# Patient Record
Sex: FEMALE | Race: WHITE | NOT HISPANIC OR LATINO | Employment: UNEMPLOYED | URBAN - NONMETROPOLITAN AREA
[De-identification: names, ages, dates, MRNs, and addresses within clinical notes are randomized per-mention and may not be internally consistent; named-entity substitution may affect disease eponyms.]

---

## 2017-01-18 ENCOUNTER — OFFICE VISIT - GICH (OUTPATIENT)
Dept: FAMILY MEDICINE | Facility: OTHER | Age: 37
End: 2017-01-18

## 2017-01-18 DIAGNOSIS — J18.9 PNEUMONIA: ICD-10-CM

## 2017-01-18 LAB — INFLUENZA ANTIGEN - HISTORICAL: NORMAL

## 2017-07-03 ENCOUNTER — OFFICE VISIT - GICH (OUTPATIENT)
Dept: FAMILY MEDICINE | Facility: OTHER | Age: 37
End: 2017-07-03

## 2017-07-03 ENCOUNTER — HISTORY (OUTPATIENT)
Dept: FAMILY MEDICINE | Facility: OTHER | Age: 37
End: 2017-07-03

## 2017-07-03 DIAGNOSIS — J00 ACUTE NASOPHARYNGITIS (COMMON COLD): ICD-10-CM

## 2017-08-07 ENCOUNTER — OFFICE VISIT - GICH (OUTPATIENT)
Dept: FAMILY MEDICINE | Facility: OTHER | Age: 37
End: 2017-08-07

## 2017-08-07 ENCOUNTER — HISTORY (OUTPATIENT)
Dept: FAMILY MEDICINE | Facility: OTHER | Age: 37
End: 2017-08-07

## 2017-08-07 DIAGNOSIS — R10.84 GENERALIZED ABDOMINAL PAIN: ICD-10-CM
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
## 2017-12-28 NOTE — PROGRESS NOTES
"Patient Information     Patient Name MRN Sex Gretchen Hackett [id number] Female 1980      Progress Notes by Jayda Vinson NP at 7/3/2017 12:00 PM     Author:  Jayda Vinson NP Service:  (none) Author Type:  PHYS- Nurse Practitioner     Filed:  7/3/2017 12:21 PM Encounter Date:  7/3/2017 Status:  Signed     :  Jayda Vinson NP (PHYS- Nurse Practitioner)            Nursing Notes:   Mirela Jackson  7/3/2017 12:15 PM  Signed  Patient presents to clinic with chest congestion x2days  Mirela JacksonLPN ....................  7/3/2017   12:05 PM      SUBJECTIVE:    Gretchen Vogel is a 37 y.o. female who presents for Chest Congestion and cough for 2 days    URI    This is a new problem. The current episode started yesterday. The problem has been gradually worsening. Maximum temperature: 100.2. The fever has been present for less than 1 day. Associated symptoms include congestion, coughing, rhinorrhea and a sore throat. Pertinent negatives include no chest pain, ear pain, headaches, plugged ear sensation, rash, sinus pain, sneezing, swollen glands, vomiting or wheezing. She has tried decongestant, NSAIDs, sleep and increased fluids for the symptoms. The treatment provided mild relief.       No current outpatient prescriptions on file prior to visit.     No current facility-administered medications on file prior to visit.        REVIEW OF SYSTEMS:  Review of Systems   HENT: Positive for congestion, rhinorrhea and sore throat. Negative for ear pain and sneezing.    Respiratory: Positive for cough. Negative for wheezing.    Cardiovascular: Negative for chest pain.   Gastrointestinal: Negative for vomiting.   Skin: Negative for rash.   Neurological: Negative for headaches.       OBJECTIVE:  /72  Pulse 68  Temp 98.5  F (36.9  C) (Tympanic)  Resp 18  Ht 1.664 m (5' 5.5\")  Wt 79.5 kg (175 lb 3.2 oz)  LMP 2017  BMI 28.71 kg/m2    EXAM:   Physical Exam   Constitutional: She is " well-developed, well-nourished, and in no distress.   HENT:   Head: Normocephalic and atraumatic.   Right Ear: Tympanic membrane and ear canal normal.   Left Ear: Tympanic membrane and ear canal normal.   Nose: Rhinorrhea present.   Mouth/Throat: Uvula is midline, oropharynx is clear and moist and mucous membranes are normal.   Eyes: Conjunctivae are normal.   Neck: Neck supple.   Cardiovascular: Normal rate, regular rhythm and normal heart sounds.    Pulmonary/Chest: Effort normal. No respiratory distress. She has no decreased breath sounds. She has no wheezes. She has no rhonchi. She has no rales.   Dry cough is heard   Lymphadenopathy:     She has no cervical adenopathy.   Nursing note and vitals reviewed.      ASSESSMENT/PLAN:    ICD-10-CM    1. Acute nasopharyngitis J00         Plan:  Home cares and OTC gone over. Symptoms likely due to virus. No antibiotic is needed at this time. Symptoms typically worse on days 2-5 and then stabilize and you are sick for days 5-12. Days 12-14 there is slow resolution and if there is a cough, studies show it can linger longer, however one is not as ill as in the beginning. If symptoms begin worsening or fail to improve after 14 days, return to clinic for reevaluation. All questions were answered and she is in agreement with plan.       AMELIA STERN NP ....................  7/3/2017   12:21 PM

## 2017-12-28 NOTE — PATIENT INSTRUCTIONS
Patient Information     Patient Name MRN Sex Gretchen Hackett [id number] Female 1980      Patient Instructions by Geoffrey Schafer MD at 2017  2:00 PM     Author:  Geoffrey Schafer MD Service:  (none) Author Type:  Physician     Filed:  2017  3:25 PM Encounter Date:  2017 Status:  Signed     :  Geoffrey Schafer MD (Physician)            Take omeprazole 20 mg daily for 2 months  Take sucralfate four times daily for 10 days, then stop  If not better in 2 weeks, then return to consider other testing

## 2017-12-29 NOTE — PATIENT INSTRUCTIONS
Patient Information     Patient Name MRN Gretchen Doyle [id number] Female 1980      Patient Instructions by Jayda Vinson NP at 7/3/2017 12:00 PM     Author:  Jayda Vinson NP Service:  (none) Author Type:  PHYS- Nurse Practitioner     Filed:  7/3/2017 12:16 PM Encounter Date:  7/3/2017 Status:  Signed     :  Jayda Vinson NP (PHYS- Nurse Practitioner)            Cold Medicines   What are cold medicines?  Symptoms of the common cold start gradually over several days and usually last about two weeks. Symptoms may include sneezing, a stuffy or runny nose, sore throat, cough, watery eyes, mild headache, or body aches. A cold will go away on its own without treatment. However, there are many nonprescription products that may help relieve some of the symptoms of a cold. Cold medicines often contain more than one ingredient and are used to treat more than one symptom. Read the labels and buy products that have only the ingredients that you need. If you are not sure which medicine is best, ask your pharmacist.  How do they work?  Decongestants reduce swelling in your nose and sinuses. They may also lessen the amount of mucus made by your nose. If you use decongestants more often than directed, your stuffy nose may get worse.   Antihistamines block the effect of histamine. Histamine is a chemical your body makes when you have an allergic reaction. Antihistamines are most often used to treat itchy or watery eyes or a stuffy or runny nose caused by an allergy. Antihistamines may not help a stuffy or runny nose caused by a cold because they can make mucus thick and dry.  Mucolytics are medicines that make mucus thinner so that it is easier to cough up out of your throat and lungs.  Expectorants are cough medicines that may help to keep the mucus thin and bring up mucus from the lungs when you cough. This may relieve chest congestion and make it easier to breathe.   Cough suppressants  (antitussives) are medicines that lessen the urge to cough. They may give relief from a dry, hacking cough. If you have a cough that is wet sounding and produces mucus, it is important for you to cough the mucus up out of your lungs. For this reason, cough suppressants are not recommended for a wet sounding cough.  Fever and pain relievers, such as acetaminophen, aspirin, or other nonsteroidal anti-inflammatory drugs (NSAIDs), are often included in cold medicine. Read labels carefully to avoid taking more medicine than you need.  What else do I need to know about this medicine?    Talk to your healthcare provider if your symptoms start suddenly or you have severe symptoms. This may mean you have something more serious than a cold.    Follow the directions that come with your medicine, including information about food or alcohol. Make sure you know how and when to take your medicine. Do not take more or less than you are supposed to take.    Try to get all of your medicine at the same place. Your pharmacist can help make sure that all of your medicines are safe to take together.    Keep a list of your medicines with you. List all of the prescription medicines, nonprescription medicines, supplements, natural remedies, and vitamins that you take. Tell all healthcare providers who treat you about all of the products you are taking.    Many medicines have side effects. A side effect is a symptom or problem that is caused by the medicine. Ask your healthcare provider or pharmacist what side effects the medicine may cause and what you should do if you have side effects.    Nonsteroidal anti-inflammatory medicines (NSAIDs), such as ibuprofen, naproxen, and aspirin, may cause stomach bleeding and other problems. These risks increase with age. Read the label and take as directed. Unless recommended by your healthcare provider, do not take for more than 10 days for any reason.    Acetaminophen may cause liver damage or other  problems. Unless recommended by your provider, don't take more than 3000 milligrams (mg) in 24 hours. To make sure you don t take too much, check other medicines you take to see if they also contain acetaminophen. Ask your provider if you need to avoid drinking alcohol while taking this medicine.  If you have any questions, ask your healthcare provider or pharmacist for more information. Be sure to keep all appointments for provider visits or tests.      Symptoms likely due to virus. No antibiotic is needed at this time. Symptoms typically worse on days 2-5 and then stabilize and you are sick for days 5-12. Days 12-14 there is slow resolution and if there is a cough, studies show it can linger longer, however one is not as ill as in the beginning. If symptoms begin worsening or fail to improve after 14 days, return to clinic for reevaluation.

## 2017-12-30 NOTE — NURSING NOTE
Patient Information     Patient Name MRN Gretchen Doyle [id number] Female 1980      Nursing Note by Mabel Devine at 2017  2:00 PM     Author:  Mabel Devine Service:  (none) Author Type:  (none)     Filed:  2017  3:05 PM Encounter Date:  2017 Status:  Signed     :  Mabel Devine            Maalox Plus ordered by Geoffrey Schafer MD.  Medication administered per written order   Lot # [id number]  Exp. 10/31/18  NDC 9620-4969-30  Patient tolerated well.  Mabel Devine LPN..................2017  3:02 PMM

## 2017-12-30 NOTE — NURSING NOTE
Patient Information     Patient Name MRN Gretchen Doyle [id number] Female 1980      Nursing Note by Mabel Devine at 2017  2:00 PM     Author:  Mabel Devine Service:  (none) Author Type:  (none)     Filed:  2017  2:31 PM Encounter Date:  2017 Status:  Signed     :  Mabel Devinesandra Vogel is a 37 y.o. female presenting with abdominal pain along with left sided facial/ear pain.  Mabel Devine LPN 2017 2:22 PM

## 2017-12-30 NOTE — NURSING NOTE
Patient Information     Patient Name MRN Sex Gretchen Hackett [id number] Female 1980      Nursing Note by Mirela Jackson at 7/3/2017 12:00 PM     Author:  Mirela Jackson Service:  (none) Author Type:  (none)     Filed:  7/3/2017 12:15 PM Encounter Date:  7/3/2017 Status:  Signed     :  Mirela Jackson            Patient presents to clinic with chest congestion x2days  Mirela Barclay ....................  7/3/2017   12:05 PM

## 2018-01-03 NOTE — PROGRESS NOTES
"Patient Information     Patient Name MRN Sex Gretchen Hackett [id number] Female 1980      Progress Notes by Zach Trevino MD at 2017  1:30 PM     Author:  Zach Trevino MD Service:  (none) Author Type:  Physician     Filed:  2017  9:53 PM Encounter Date:  2017 Status:  Signed     :  Zach Trevino MD (Physician)            SUBJECTIVE:  Gretchen Vogel is a 36 y.o. Female.  She comes in due to a 1wk history of cough.  She reports intermittent fevers and chills but has not recorded her temperature.  She reports increased shortness of breath and sputum production over the past two days.  She thought she had a cigarette \"allergy\" so she started vaping instead which did not help.  She is concerned about a yellow film she developed today on her tongue.      OBJECTIVE:  /78  Temp 100.9  F (38.3  C)  Wt 86.6 kg (191 lb)  BMI 31.3 kg/m2  EXAM:  General Appearance: Pleasant, alert, appropriate appearance for age. No acute distress  OroPharynx Exam: I do not appreciate any abnormality today  Chest/Respiratory Exam: Bilateral rhonchi.  Good air entry.  Cardiovascular Exam: Regular rate and rhythm. S1, S2, no murmur, click, gallop, or rubs.    Results for orders placed or performed in visit on 17       INFLUENZA ANTIGEN A & B  CLINIC IN HOUSE       Result   Value Ref Range    INFLUENZA ANTIGEN                                 Negative for Influenza A antigen; Negative for Influenza B antigen         ASSESSMENT/Plan :      Gretchen was seen today for cough.    Diagnoses and all orders for this visit:    CAP (community acquired pneumonia)  Patient was quite resistant to any lab workup and declined imaging.  She did finally agree to influenza testing after significant discussion.  Given her fever and worsening symptoms will treat with azithromycin but suggest close follow up if symptoms do not resolve promptly.    Strongly suggest tobacco cessation and cessation of " vaping.  Unclear if this could have contributed to her perceived yellowing of the tongue.    -     INFLUENZA ANTIGEN A & B; Future  -     INFLUENZA ANTIGEN A & B  -     azithromycin (ZITHROMAX) 250 mg tablet; Take 500 mg (2 tabs) by mouth on day 1, then 250 mg (1 tab) daily for days 2-5.        There are no Patient Instructions on file for this visit.    Zach Trevino MD    This document was created using computer generated templates and voice activated software.

## 2018-01-25 VITALS
HEART RATE: 68 BPM | TEMPERATURE: 98.5 F | HEIGHT: 66 IN | BODY MASS INDEX: 28.16 KG/M2 | SYSTOLIC BLOOD PRESSURE: 124 MMHG | WEIGHT: 175.2 LBS | RESPIRATION RATE: 18 BRPM | DIASTOLIC BLOOD PRESSURE: 72 MMHG

## 2018-01-26 VITALS
HEART RATE: 121 BPM | WEIGHT: 170 LBS | DIASTOLIC BLOOD PRESSURE: 81 MMHG | SYSTOLIC BLOOD PRESSURE: 137 MMHG | BODY MASS INDEX: 27.86 KG/M2

## 2018-01-26 VITALS
DIASTOLIC BLOOD PRESSURE: 78 MMHG | BODY MASS INDEX: 31.3 KG/M2 | TEMPERATURE: 100.9 F | SYSTOLIC BLOOD PRESSURE: 112 MMHG | WEIGHT: 191 LBS

## 2018-02-14 ENCOUNTER — DOCUMENTATION ONLY (OUTPATIENT)
Dept: FAMILY MEDICINE | Facility: OTHER | Age: 38
End: 2018-02-14

## 2018-02-14 PROBLEM — F43.10 POSTTRAUMATIC STRESS DISORDER: Status: ACTIVE | Noted: 2018-02-14

## 2018-02-14 PROBLEM — F98.8 ATTENTION DEFICIT DISORDER: Status: ACTIVE | Noted: 2018-02-14

## 2018-02-14 PROBLEM — Z72.0 TOBACCO USER: Status: ACTIVE | Noted: 2018-02-14

## 2018-02-14 PROBLEM — F32.A DEPRESSION: Status: ACTIVE | Noted: 2018-02-14

## 2018-02-14 PROBLEM — F25.9 SCHIZOAFFECTIVE DISORDER (H): Status: ACTIVE | Noted: 2018-02-14

## 2018-02-14 RX ORDER — SENNOSIDES 8.6 MG
650 CAPSULE ORAL EVERY 8 HOURS PRN
COMMUNITY
Start: 2017-08-07

## 2018-03-27 ENCOUNTER — OFFICE VISIT (OUTPATIENT)
Dept: FAMILY MEDICINE | Facility: OTHER | Age: 38
End: 2018-03-27
Attending: FAMILY MEDICINE
Payer: COMMERCIAL

## 2018-03-27 VITALS
WEIGHT: 181 LBS | SYSTOLIC BLOOD PRESSURE: 124 MMHG | HEART RATE: 111 BPM | BODY MASS INDEX: 29.66 KG/M2 | DIASTOLIC BLOOD PRESSURE: 88 MMHG | TEMPERATURE: 97.6 F | OXYGEN SATURATION: 97 %

## 2018-03-27 DIAGNOSIS — J11.1 INFLUENZA-LIKE ILLNESS: Primary | ICD-10-CM

## 2018-03-27 PROCEDURE — 99213 OFFICE O/P EST LOW 20 MIN: CPT | Performed by: FAMILY MEDICINE

## 2018-03-27 PROCEDURE — G0463 HOSPITAL OUTPT CLINIC VISIT: HCPCS

## 2018-03-27 RX ORDER — OSELTAMIVIR PHOSPHATE 75 MG/1
75 CAPSULE ORAL 2 TIMES DAILY
Qty: 10 CAPSULE | Refills: 0 | Status: SHIPPED | OUTPATIENT
Start: 2018-03-27

## 2018-03-27 NOTE — PROGRESS NOTES
Nursing Notes:   Pamella Mejia LPN  3/27/2018  9:21 AM  Addendum  Pt presents to clinic for URI/Flu like symptoms for three days  Pamella Mejia LPN.......3/27/2018 9:16 AM        SUBJECTIVE:  Gretchen Vogel is a 37 year old female who comes in today due to 2 day history of cough, runny nose and body aches.  She reports yesterday she had fevers chills and headache but that seems to be better today.  Boyfriend diagnosed with influenza B less than a week ago.  She reports close exposure to him.    Current Outpatient Prescriptions   Medication Sig Dispense Refill     oseltamivir (TAMIFLU) 75 MG capsule Take 1 capsule (75 mg) by mouth 2 times daily 10 capsule 0     acetaminophen (TYLENOL) 650 MG CR tablet Take 650 mg by mouth every 8 hours as needed       omeprazole (PRILOSEC) 20 MG CR capsule Take 20 mg by mouth daily         /88 (BP Location: Right arm, Patient Position: Sitting, Cuff Size: Adult Regular)  Pulse 111  Temp 97.6  F (36.4  C) (Oral)  Wt 181 lb (82.1 kg)  SpO2 97%  BMI 29.66 kg/m2    Exam:  General Appearance: Pleasant, alert, appropriate appearance for age. No acute distress  Ear Exam: TMs nl.  No erythema  Nose Exam: Pale and boggy turbinates.  Clear drainage noted.  No sinus tenderness at present.  OroPharynxExam: Mild posterior inflammation  Neck Exam: Supple, no masses or nodes.  Chest/Respiratory Exam: Normal chest wall and respirations. Clear to auscultation.  Cardiovascular Exam: Regular rate and rhythm. S1, S2, nomurmur, click, gallop, or rubs.        ASSESSMENT/Plan :    Results for orders placed or performed in visit on 01/18/17   Influenza A/B antigen   Result Value Ref Range    Influenza Antigen - Historical       Negative for Influenza A antigen; Negative for Influenza B antigen    Narrative    When influenza prevalence is low, false positives are more  likely.  A negative test does not rule out influenza infection.       No images are attached to the encounter or orders  placed in the encounter.      1. Influenza-like illness  Exam and history are suggestive of influenza.  Will start tamiflu.  Stressed importance of rest, fluids, and hand washing.  If patient has significant decompensation/new symptoms they will come in for re-evaluation.  If no improvement in 7-10d, they will be re-evaluated aswell.     May use generic afrin for sinus congestion for a maximum of 2d.  Discussed in detail with patient.  - oseltamivir (TAMIFLU) 75 MG capsule; Take 1 capsule (75 mg) by mouth 2 times daily  Dispense: 10 capsule; Refill: 0          Patient Instructions   Oxymetazoline HCL  Nasal Decongestant         Zach Trevino    This document was created using computer generated templates and voiceactivated software.

## 2018-03-27 NOTE — MR AVS SNAPSHOT
"              After Visit Summary   3/27/2018    Gretchen Vogel    MRN: [id number]           Patient Information     Date Of Birth          1980        Visit Information        Provider Department      3/27/2018 9:30 AM Zach Trevino MD [facility]        Today's Diagnoses     Influenza-like illness    -  1      Care Instructions    Oxymetazoline HCL  Nasal Decongestant             Follow-ups after your visit        Who to contact     If you have questions or need follow up information about today's clinic visit or your schedule please contact [facility] directly at [phone].  Normal or non-critical lab and imaging results will be communicated to you by [employer], letter or phone within 4 business days after the clinic has received the results. If you do not hear from us within 7 days, please contact the clinic through [facility] or phone. If you have a critical or abnormal lab result, we will notify you by phone as soon as possible.  Submit refill requests through [facility] or call your pharmacy and they will forward the refill request to us. Please allow 3 business days for your refill to be completed.          Additional Information About Your Visit        MyChart Information     [facility] lets you send messages to your doctor, view your test results, renew your prescriptions, schedule appointments and more. To sign up, go to [email or web address] . Click on \"Log in\" on the left side of the screen, which will take you to the Welcome page. Then click on \"Sign up Now\" on the right side of the page.     You will be asked to enter the access code listed below, as well as some personal information. Please follow the directions to create your username and password.     Your access code is: [email or web address]  Expires: 2018  9:59 AM     Your access code will  in 90 days. If you need help or a new code, please call your [facility] clinic or [phone].        Care " EveryWhere ID     This is your Care EveryWhere ID. This could be used by other organizations to access your [facility] medical records  [id number]        Your Vitals Were     Pulse Temperature Pulse Oximetry BMI (Body Mass Index)          111 97.6  F (36.4  C) (Oral) 97% 29.66 kg/m2         Blood Pressure from Last 3 Encounters:   03/27/18 124/88   08/07/17 137/81   07/03/17 124/72    Weight from Last 3 Encounters:   03/27/18 181 lb (82.1 kg)   08/07/17 170 lb (77.1 kg)   07/03/17 175 lb 3.2 oz (79.5 kg)              Today, you had the following     No orders found for display         Today's Medication Changes          These changes are accurate as of 3/27/18  9:59 AM.  If you have any questions, ask your nurse or doctor.               Start taking these medicines.        Dose/Directions    oseltamivir 75 MG capsule   Commonly known as:  TAMIFLU   Used for:  Influenza-like illness   Started by:  Zach Trevino MD        Dose:  75 mg   Take 1 capsule (75 mg) by mouth 2 times daily   Quantity:  10 capsule   Refills:  0            Where to get your medicines      These medications were sent to [id number] - [place], [place] - [address]  [address], [facility] [address]     Phone:  [phone]     oseltamivir 75 MG capsule                Primary Care Provider Office Phone # Fax #    Jo Vickie Hernandez [phone]       [address] [facility] [address]        Equal Access to Services     [facility]: Hadii noemy mcclellan Sonereida, waaxda luqadaha, qaybta kaalmada javed ortiz idigalen drummond. So [facility] [phone].    ATENCIÓN: Si habla español, tiene a razo disposición servicios gratuitos de asistencia lingüística. Llame al [phone].    We comply with applicable federal civil rights laws and [place] laws. We do not discriminate on the basis of race, color, national origin, age, disability, sex, sexual  orientation, or gender identity.            Thank you!     Thank you for choosing [facility] AND [facility]  for your care. Our goal is always to provide you with excellent care. Hearing back from our patients is one way we can continue to improve our services. Please take a few minutes to complete the written survey that you may receive in the mail after your visit with us. Thank you!             Your Updated Medication List - Protect others around you: Learn how to safely use, store and throw away your medicines at www.disposemymeds.org.          This list is accurate as of 3/27/18  9:59 AM.  Always use your most recent med list.                   Brand Name Dispense Instructions for use Diagnosis    acetaminophen 650 MG CR tablet    TYLENOL     Take 650 mg by mouth every 8 hours as needed        omeprazole 20 MG CR capsule    priLOSEC     Take 20 mg by mouth daily        oseltamivir 75 MG capsule    TAMIFLU    10 capsule    Take 1 capsule (75 mg) by mouth 2 times daily    Influenza-like illness

## 2018-03-27 NOTE — NURSING NOTE
Pt presents to clinic for URI/Flu like symptoms for three days  Pamella Mejia LPN.......3/27/2018 9:16 AM

## 2024-10-22 ENCOUNTER — HOSPITAL ENCOUNTER (EMERGENCY)
Facility: OTHER | Age: 44
Discharge: HOME | End: 2024-10-22
Attending: PHYSICIAN ASSISTANT | Admitting: PHYSICIAN ASSISTANT
Payer: COMMERCIAL

## 2024-10-22 VITALS
HEART RATE: 90 BPM | OXYGEN SATURATION: 98 % | RESPIRATION RATE: 28 BRPM | SYSTOLIC BLOOD PRESSURE: 150 MMHG | HEIGHT: 66 IN | DIASTOLIC BLOOD PRESSURE: 104 MMHG | WEIGHT: 148.4 LBS | TEMPERATURE: 98.5 F | BODY MASS INDEX: 23.85 KG/M2

## 2024-10-22 DIAGNOSIS — K04.7 DENTAL INFECTION: ICD-10-CM

## 2024-10-22 PROCEDURE — 99283 EMERGENCY DEPT VISIT LOW MDM: CPT | Performed by: PHYSICIAN ASSISTANT

## 2024-10-22 PROCEDURE — 99284 EMERGENCY DEPT VISIT MOD MDM: CPT | Performed by: PHYSICIAN ASSISTANT

## 2024-10-22 RX ORDER — MULTIVITAMIN WITH IRON
1 TABLET ORAL DAILY
COMMUNITY

## 2024-10-22 RX ORDER — OXYCODONE AND ACETAMINOPHEN 5; 325 MG/1; MG/1
1 TABLET ORAL EVERY 6 HOURS PRN
Qty: 12 TABLET | Refills: 0 | Status: SHIPPED | OUTPATIENT
Start: 2024-10-22

## 2024-10-22 RX ORDER — IBUPROFEN 600 MG/1
600 TABLET, FILM COATED ORAL EVERY 6 HOURS PRN
Qty: 30 TABLET | Refills: 0 | Status: SHIPPED | OUTPATIENT
Start: 2024-10-22

## 2024-10-22 RX ORDER — DOCUSATE SODIUM 100 MG/1
100 CAPSULE, LIQUID FILLED ORAL 2 TIMES DAILY
Qty: 14 CAPSULE | Refills: 0 | Status: SHIPPED | OUTPATIENT
Start: 2024-10-22

## 2024-10-22 RX ORDER — VILOXAZINE HYDROCHLORIDE 200 MG/1
400 CAPSULE, EXTENDED RELEASE ORAL DAILY
COMMUNITY
Start: 2024-09-28

## 2024-10-22 ASSESSMENT — COLUMBIA-SUICIDE SEVERITY RATING SCALE - C-SSRS
6. HAVE YOU EVER DONE ANYTHING, STARTED TO DO ANYTHING, OR PREPARED TO DO ANYTHING TO END YOUR LIFE?: YES
1. IN THE PAST MONTH, HAVE YOU WISHED YOU WERE DEAD OR WISHED YOU COULD GO TO SLEEP AND NOT WAKE UP?: NO
2. HAVE YOU ACTUALLY HAD ANY THOUGHTS OF KILLING YOURSELF IN THE PAST MONTH?: NO

## 2024-10-22 NOTE — DISCHARGE INSTRUCTIONS
-Take Augmentin 1 tablet every 12 hours for 10 days for treatment of dental infection.  -Alternate ibuprofen 600 mg and Percocet 1 tablet every 3 hours for inflammation and pain.  -Take docusate 100 mg every 12 hours to prevent constipation.  -Follow-up with Jurgen Harper as soon as possible  -Please return to the ER if you have worsening symptoms despite antibiotics to include worsening pain, fever, facial or throat swelling, unable to open mouth, stiff neck, headache, or any other concerning symptom.

## 2024-10-22 NOTE — ED TRIAGE NOTES
"Pt presents to ED via private car with c/o Lt lower dental pain and anxiety. Pt states the pain started a few days ago. Pt took 6 500mg tablets of tylenol at 0700 this morning. BP (!) 150/104   Pulse (!) 145   Temp 98.5  F (36.9  C) (Tympanic)   Resp 28   Ht 1.664 m (5' 5.5\")   Wt 67.3 kg (148 lb 6.4 oz)   SpO2 98%   BMI 24.32 kg/m         Triage Assessment (Adult)       Row Name 10/22/24 1017          Triage Assessment    Airway WDL WDL        Respiratory WDL    Respiratory WDL X;rhythm/pattern     Rhythm/Pattern, Respiratory tachypneic        Skin Circulation/Temperature WDL    Skin Circulation/Temperature WDL WDL        Cardiac WDL    Cardiac WDL X;rhythm     Pulse Rate & Regularity tachycardic        Peripheral/Neurovascular WDL    Peripheral Neurovascular WDL WDL        Cognitive/Neuro/Behavioral WDL    Cognitive/Neuro/Behavioral WDL WDL                     " MED
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
## 2025-03-25 ENCOUNTER — HOSPITAL ENCOUNTER (EMERGENCY)
Facility: OTHER | Age: 45
Discharge: HOME | End: 2025-03-25
Payer: COMMERCIAL

## 2025-03-25 VITALS
TEMPERATURE: 97.6 F | BODY MASS INDEX: 25.83 KG/M2 | HEART RATE: 115 BPM | RESPIRATION RATE: 18 BRPM | SYSTOLIC BLOOD PRESSURE: 155 MMHG | OXYGEN SATURATION: 100 % | DIASTOLIC BLOOD PRESSURE: 102 MMHG | HEIGHT: 65 IN | WEIGHT: 155 LBS

## 2025-03-25 DIAGNOSIS — K08.89 PAIN, DENTAL: ICD-10-CM

## 2025-03-25 PROCEDURE — 250N000013 HC RX MED GY IP 250 OP 250 PS 637

## 2025-03-25 PROCEDURE — 99283 EMERGENCY DEPT VISIT LOW MDM: CPT

## 2025-03-25 RX ORDER — IBUPROFEN 400 MG/1
800 TABLET, FILM COATED ORAL ONCE
Status: COMPLETED | OUTPATIENT
Start: 2025-03-25 | End: 2025-03-25

## 2025-03-25 RX ADMIN — IBUPROFEN 800 MG: 400 TABLET, FILM COATED ORAL at 13:51

## 2025-03-25 ASSESSMENT — COLUMBIA-SUICIDE SEVERITY RATING SCALE - C-SSRS
1. IN THE PAST MONTH, HAVE YOU WISHED YOU WERE DEAD OR WISHED YOU COULD GO TO SLEEP AND NOT WAKE UP?: NO
6. HAVE YOU EVER DONE ANYTHING, STARTED TO DO ANYTHING, OR PREPARED TO DO ANYTHING TO END YOUR LIFE?: NO
2. HAVE YOU ACTUALLY HAD ANY THOUGHTS OF KILLING YOURSELF IN THE PAST MONTH?: NO

## 2025-03-25 NOTE — ED TRIAGE NOTES
"Pt presents to ED via private car with c/o Rt lower dental pain. Pt is scheduled to have teeth removed on 4/8/2025. BP (!) 155/102   Pulse 115   Temp 97.6  F (36.4  C) (Tympanic)   Resp 18   Ht 1.651 m (5' 5\")   Wt 70.3 kg (155 lb)   SpO2 100%   BMI 25.79 kg/m      Pt took tylenol at 0945     Triage Assessment (Adult)       Row Name 03/25/25 1315          Triage Assessment    Airway WDL WDL        Respiratory WDL    Respiratory WDL WDL        Skin Circulation/Temperature WDL    Skin Circulation/Temperature WDL WDL        Cardiac WDL    Cardiac WDL X;rhythm     Pulse Rate & Regularity tachycardic        Peripheral/Neurovascular WDL    Peripheral Neurovascular WDL WDL        Cognitive/Neuro/Behavioral WDL    Cognitive/Neuro/Behavioral WDL WDL                     "
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
## 2025-03-25 NOTE — DISCHARGE INSTRUCTIONS
As discussed I suspect you have a tooth abscess. Please take Augmentin (antibiotic) as directed. Augmentin prescription sent to [facility] pharmacy. Tylenol or ibuprofen as needed for pain. Follow up with your dentist ASAP.     Please return to the emergency room if you experience worsening pain, facial swelling, skin redness, fever, vomiting, throat swelling, or any new or worsening symptoms.

## 2025-04-10 ENCOUNTER — HOSPITAL ENCOUNTER (EMERGENCY)
Facility: OTHER | Age: 45
Discharge: HOME | End: 2025-04-10
Payer: COMMERCIAL

## 2025-04-10 VITALS
BODY MASS INDEX: 25.23 KG/M2 | SYSTOLIC BLOOD PRESSURE: 127 MMHG | DIASTOLIC BLOOD PRESSURE: 70 MMHG | RESPIRATION RATE: 16 BRPM | OXYGEN SATURATION: 100 % | TEMPERATURE: 97.1 F | WEIGHT: 157 LBS | HEIGHT: 66 IN | HEART RATE: 93 BPM

## 2025-04-10 DIAGNOSIS — R68.84 JAW PAIN: ICD-10-CM

## 2025-04-10 PROCEDURE — 99283 EMERGENCY DEPT VISIT LOW MDM: CPT

## 2025-04-10 RX ORDER — OXYCODONE HYDROCHLORIDE 5 MG/1
5 TABLET ORAL ONCE
Status: COMPLETED | OUTPATIENT
Start: 2025-04-10 | End: 2025-04-10

## 2025-04-10 RX ORDER — OXYCODONE HYDROCHLORIDE 5 MG/1
5 TABLET ORAL EVERY 6 HOURS PRN
Qty: 10 TABLET | Refills: 0 | Status: SHIPPED | OUTPATIENT
Start: 2025-04-10

## 2025-04-10 ASSESSMENT — ACTIVITIES OF DAILY LIVING (ADL): ADLS_ACUITY_SCORE: 41
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
## 2025-04-10 NOTE — ED TRIAGE NOTES
Patient arrives today with c/o dental pain. States that she had multiple teeth extracted yesterday and was 'given nothing for pain management there and expected to get by with over the counter medications.' Patient states that the OTC meds and salt water gargles have not helped and she is in 10/10 pain.      Triage Assessment (Adult)       Row Name 04/10/25 2050          Triage Assessment    Airway WDL WDL        Respiratory WDL    Respiratory WDL WDL        Skin Circulation/Temperature WDL    Skin Circulation/Temperature WDL WDL        Cardiac WDL    Cardiac WDL WDL        Peripheral/Neurovascular WDL    Peripheral Neurovascular WDL WDL        Cognitive/Neuro/Behavioral WDL    Cognitive/Neuro/Behavioral WDL WDL

## 2025-04-10 NOTE — DISCHARGE INSTRUCTIONS
Gretchen,   I am sorry you are not doing well after your surgery.  Monitor for increased oral or neck pain swelling fever, return to be seen if worsening symptoms  Follow up with primary care for recheck as needed, dentist as scheduled.  Oxycodone as needed for severe pain. Information in hand out.  I hope you heal fast.

## 2025-04-15 ENCOUNTER — HOSPITAL ENCOUNTER (EMERGENCY)
Facility: OTHER | Age: 45
Discharge: HOME | End: 2025-04-15
Payer: COMMERCIAL

## 2025-04-15 VITALS
DIASTOLIC BLOOD PRESSURE: 98 MMHG | BODY MASS INDEX: 26.14 KG/M2 | HEART RATE: 121 BPM | TEMPERATURE: 98 F | RESPIRATION RATE: 16 BRPM | WEIGHT: 156.9 LBS | OXYGEN SATURATION: 98 % | HEIGHT: 65 IN | SYSTOLIC BLOOD PRESSURE: 150 MMHG

## 2025-04-15 DIAGNOSIS — K08.89 PAIN, DENTAL: ICD-10-CM

## 2025-04-15 PROCEDURE — 99283 EMERGENCY DEPT VISIT LOW MDM: CPT

## 2025-04-15 RX ORDER — KETOROLAC TROMETHAMINE 10 MG/1
10 TABLET, FILM COATED ORAL EVERY 6 HOURS PRN
Qty: 10 TABLET | Refills: 0 | Status: SHIPPED | OUTPATIENT
Start: 2025-04-15

## 2025-04-15 ASSESSMENT — COLUMBIA-SUICIDE SEVERITY RATING SCALE - C-SSRS
2. HAVE YOU ACTUALLY HAD ANY THOUGHTS OF KILLING YOURSELF IN THE PAST MONTH?: NO
6. HAVE YOU EVER DONE ANYTHING, STARTED TO DO ANYTHING, OR PREPARED TO DO ANYTHING TO END YOUR LIFE?: YES
1. IN THE PAST MONTH, HAVE YOU WISHED YOU WERE DEAD OR WISHED YOU COULD GO TO SLEEP AND NOT WAKE UP?: NO

## 2025-04-15 ASSESSMENT — ACTIVITIES OF DAILY LIVING (ADL): ADLS_ACUITY_SCORE: 41

## 2025-04-15 NOTE — DISCHARGE INSTRUCTIONS
Toradol as needed for pain. Take with food, drink plenty of fluids. Do not take motrin, advil, ibuprofen, aleve- these are the same.   Call your dentist make a follow up appointment.  Return to be seen:     You passed out (lost consciousness).     You have trouble breathing.   Call your dentist now or seek immediate medical care if:    You have pain that does not get better after you take pain medicine.     You have loose stitches, or your incision comes open.     You have new or more bleeding from the site.     You have signs of infection, such as:  Increased pain, swelling, warmth, or redness.  Red streaks leading from the area.  Pus draining from the area.  A fever.   More information in hand out

## 2025-04-15 NOTE — ED TRIAGE NOTES
"ED Nursing Triage Note (General)   ________________________________    Gretchensandra Vogel is a 44 year old Female that presents to triage private car  with history of  tooth pain / post op problem reported by patient . Symptoms started this morning. Interventions prior to arrival include tylenol and ibuprofen. Patient states she recently had multiple teeth removed and she threw up last night and thinks the clot came out of them..  /78   Pulse (!) 130   Temp 98  F (36.7  C) (Temporal)   Resp 16   Ht 1.651 m (5' 5\")   Wt 71.2 kg (156 lb 14.4 oz)   LMP 03/03/2025 (Approximate)   SpO2 98%   BMI 26.11 kg/m  t  Patient appears alert  and oriented    GCS Total = 15    Action taken:  waiting room         Triage Assessment (Adult)       Row Name 04/15/25 1312          Triage Assessment    Airway WDL WDL        Respiratory WDL    Respiratory WDL WDL        Skin Circulation/Temperature WDL    Skin Circulation/Temperature WDL WDL        Cardiac WDL    Cardiac WDL WDL        Peripheral/Neurovascular WDL    Peripheral Neurovascular WDL WDL        Cognitive/Neuro/Behavioral WDL    Cognitive/Neuro/Behavioral WDL WDL                     "
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
## 2025-04-26 ENCOUNTER — HEALTH MAINTENANCE LETTER (OUTPATIENT)
Age: 45
End: 2025-04-26

## (undated) RX ORDER — IBUPROFEN 400 MG/1
TABLET, FILM COATED ORAL
Status: DISPENSED
Start: 2025-03-25